# Patient Record
(demographics unavailable — no encounter records)

---

## 2025-01-07 NOTE — HISTORY OF PRESENT ILLNESS
[de-identified] : Cata Lagos is a 21 yo female who presents for evaluation of recurrent sinusitis. She notes 5-6 sinus infections in the past year, treated with multiple rounds of antibiotics. She notes recent headaches and lightheadedness. She notes significant forehead pressure and milder cheek pressure. She denies nasal congestion or rhinorrhea. She notes postnasal drainage. She denies recent fevers or chills. She denies vision changes or pain/restriction of extraocular movements. Her most recent sinus infection was 3 weeks ago, treated with antibiotics. She used flonase during the infection. She has not had allergy testing. [FreeTextEntry1] : 8/15/24 - Ms. Lagos presents for follow-up. She previously completed maximal medical therapy for sinusitis. She had improvement in symptoms but has now had increased nasal congestion and postnasal drainage. She denies fevers but notes chills. She denies vision changes, sinus pressure, or pain/restriction of extraocular movements.  1/7/25 - Cata Lagos presents for in office sinus procedure. She notes continued chronic sinusitis symptoms. She has had two sinus infections since last visit. No recent fevers.

## 2025-01-07 NOTE — PHYSICAL EXAM
[] : septum deviated to the right [Normal] : no abnormal secretions EKG/Imaging Studies/Labs/Medications

## 2025-01-07 NOTE — HISTORY OF PRESENT ILLNESS
[de-identified] : Cata Lagos is a 21 yo female who presents for evaluation of recurrent sinusitis. She notes 5-6 sinus infections in the past year, treated with multiple rounds of antibiotics. She notes recent headaches and lightheadedness. She notes significant forehead pressure and milder cheek pressure. She denies nasal congestion or rhinorrhea. She notes postnasal drainage. She denies recent fevers or chills. She denies vision changes or pain/restriction of extraocular movements. Her most recent sinus infection was 3 weeks ago, treated with antibiotics. She used flonase during the infection. She has not had allergy testing. [FreeTextEntry1] : 8/15/24 - Ms. Lagos presents for follow-up. She previously completed maximal medical therapy for sinusitis. She had improvement in symptoms but has now had increased nasal congestion and postnasal drainage. She denies fevers but notes chills. She denies vision changes, sinus pressure, or pain/restriction of extraocular movements.  1/7/25 - Cata Lagos presents for in office sinus procedure. She notes continued chronic sinusitis symptoms. She has had two sinus infections since last visit. No recent fevers.

## 2025-01-07 NOTE — ASSESSMENT
[FreeTextEntry1] : Cata Lagos presents for follow-up of chronic recurrent sinusitis and chronic rhinitis. She completed maximal medical therapy for sinusitis but had recurrence of symptoms. 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 CT sinus was obtained and reviewed showing mild septal deviation as well as right frontal, maxillary, and anterior ethmoid disease.  She underwent right frontal balloon sinuplasty, right maxillary balloon sinuplasty, right anterior ethmoidectomy in office today. She tolerated procedure well.   - sinus precautions for 2 weeks. - sinus rinses TID - start doxycycline x 7 days. Side effects of doxycycline use were discussed and include, but are not limited to diarrhea, rash, skin photosensitivity, skin hyperpigmentation, esophagitis, gastrointestinal issues, tooth discoloration, and hepatotoxicity. - tylenol prn for pain - f/u in 1 week.

## 2025-01-14 NOTE — DATA REVIEWED
[de-identified] : Pathology 1/7/25: Specimen(s) Submitted 1- Right sinus contents  Final Diagnosis  1.  Paranasal sinus, right sinus contents, endoscopic sinus surgery      - Chronic sinusitis with numerous eosinophils

## 2025-01-14 NOTE — HISTORY OF PRESENT ILLNESS
[de-identified] :  Cata Lagos is a 21 yo female who presents for evaluation of recurrent sinusitis. She notes 5-6 sinus infections in the past year, treated with multiple rounds of antibiotics. She notes recent headaches and lightheadedness. She notes significant forehead pressure and milder cheek pressure. She denies nasal congestion or rhinorrhea. She notes postnasal drainage. She denies recent fevers or chills. She denies vision changes or pain/restriction of extraocular movements. Her most recent sinus infection was 3 weeks ago, treated with antibiotics. She used flonase during the infection. She has not had allergy testing.    [FreeTextEntry1] :  8/15/24 - Ms. Lagos presents for follow-up. She previously completed maximal medical therapy for sinusitis. She had improvement in symptoms but has now had increased nasal congestion and postnasal drainage. She denies fevers but notes chills. She denies vision changes, sinus pressure, or pain/restriction of extraocular movements.  1/7/25 - Cata Lagos presents for in office sinus procedure. She notes continued chronic sinusitis symptoms. She has had two sinus infections since last visit. No recent fevers  1/14/25- Cata presents for follow up. She is s/p right frontal balloon sinuplasty, right maxillary balloon sinuplasty, right anterior ethmoidectomy in office 1/7/25. She is doing well. She denies fever or bleeding. She denies salty/metallic taste or constant clear rhinorrhea. She denies vision changes, painful extraocular movement.

## 2025-01-14 NOTE — ASSESSMENT
[FreeTextEntry1] : Cata Lagos presents for follow-up of chronic recurrent sinusitis and chronic rhinitis. She completed maximal medical therapy for sinusitis but had recurrence of symptoms. 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 CT sinus was obtained and reviewed showing mild septal deviation as well as right frontal, maxillary, and anterior ethmoid disease.  She underwent right frontal balloon sinuplasty, right maxillary balloon sinuplasty, right anterior ethmoidectomy in office on 1/7/25. She has done well since. Right sided sinus debridement was performed and she is healing nicely. Pathology revealed chronic sinusitis with numerous eosinophils. Will refer to allergy. She tolerated procedure well.   - sinus precautions for 1 more week. - sinus rinses TID - refer to allergy - f/u in 2 mo

## 2025-01-16 NOTE — SOCIAL HISTORY
[House] : [unfilled] lives in a house  [Central Forced Air] : heating provided by central forced air [Central] : air conditioning provided by central unit [Dry] : dry [Dust Mite Covers] : has dust mite covers [Bedroom] :  in bedroom [Basement] :  in basement  [Dog] : dog [Single] : single [FreeTextEntry1] : lives with mom, dad, 2 brothers Going for [employer] at [employer]  [Humidifier] : does not use a humidifier [Dehumidifier] : does not use a dehumidifier [Feather Pillows] : does not have feather pillows [Feather Comforter] : does not have a feather comforter [Living Area] : not in the living area [Smokers in Household] : there are no smokers in the home [de-identified] : DM covers at school [de-identified] : weight lifting

## 2025-01-16 NOTE — PHYSICAL EXAM
[Alert] : alert [Well Nourished] : well nourished [No Acute Distress] : no acute distress [Well Developed] : well developed [Posterior Pharyngeal Cobblestoning] : posterior pharyngeal cobblestoning [Normal Rate and Effort] : normal respiratory rhythm and effort [Normal Rate] : heart rate was normal  [Skin Intact] : skin intact  [Conjunctival Erythema] : no conjunctival erythema [Pale mucosa] : no pale mucosa [Boggy Nasal Turbinates] : no boggy and/or pale nasal turbinates [Pharyngeal erythema] : no pharyngeal erythema [Clear Rhinorrhea] : no clear rhinorrhea was seen [Wheezing] : no wheezing was heard

## 2025-01-16 NOTE — IMPRESSION
[_____] : grasses ([unfilled]) [Allergy Testing Dust Mite] : dust mites [Allergy Testing Mixed Feathers] : feathers [Allergy Testing Cockroach] : cockroach

## 2025-01-16 NOTE — REVIEW OF SYSTEMS
[Fatigue] : fatigue [Rhinorrhea] : rhinorrhea [Post Nasal Drip] : post nasal drip [Recurrent Sinus Infections] : recurrent sinus infections [Nl] : Integumentary [Fever] : no fever [Nosebleeds] : no epistaxis [Nasal Congestion] : no nasal congestion [Throat Itching] : no throat itching [Sneezing] : no sneezing [Recurrent Throat Infections] : no recurrence of throat infections [Recurrent Bronchitis] : no recurrent bronchitis [Recurrent Ear Infections] : no recurrence or ear infections [Recurrent Skin Infections] : no recurrent skin infections [Recurrent Pneumonia] : no ~T recurrent pneumonia

## 2025-01-16 NOTE — REASON FOR VISIT
[Evaluation/Consultation] : an evaluation/consultation of [Allergy Evaluation/ Skin Testing] : allergy evaluation and or skin testing [Congestion] : congestion [Runny Nose] : runny nose

## 2025-01-16 NOTE — HISTORY OF PRESENT ILLNESS
[Asthma] : asthma [Eczematous rashes] : eczematous rashes [Venom Reactions] : venom reactions [Food Allergies] : food allergies [Drug Allergies] : drug allergies [de-identified] : 21-year-old female referred by her ENT (Dr. Almendarez) here for allergy evaluation.  Pt has been complaining of chronic nasal congestion, runny nose, PND, fatigue, sinus pressure, HA for past 2-3 years with multiple sinus infections. In year 2024 pt had at least 7-8 sinus infections requiring antibiotics which was prescribed by her PCP. Last two infections did not respond to antibiotics. She has also tried Flonase PRN in past and Tylenol Cold and Sinus with minimal help. She also tried regular Tylenol which would help her HA but not her nasal symptoms.   CT scan of sinus done 08/2024 - showed mucosal thickening of paranasal sinuses with narrowing/obstruction of frontal recesses. Nasal septal deviation was also noted Pt underwent surgical intervention by ENT on 01/07/2024 - had right frontal and maxillary sinuplasty with right anterior ethmoidectomy.   Since the surgery pt is still c/o fatigue, runny nose and mild PND and would like to rule out allergies.   Pt also c/o pruritic rash whenever she goes on vacation and is exposed to sun. Currently denies any rashes  Denies any other symptoms or other infections elsewhere.

## 2025-01-16 NOTE — ASSESSMENT
[FreeTextEntry1] : 21 year old female with hx of chronic sinusitis s/p right frontal, right maxillary sinuplasty with right anterior ethmoidectomy on 01/7/2025 (Dr. Almendarez) here for allergy evaluation. Despite the surgery pt is still c/o runny nose and mild PND with fatigue - denies nasal congestion or sneezing Pt also c/o pruritic rash which occurs only when she on vacation and is exposed to sun - PMLE vs ? ACD to sunscreen.   ST today - significantly positive to tree pollen, grass pollen, moderately pos to Alternaria, mildly pos to weed pollen, dogs, cats and mouse.  Pt unable to tell if pt has increase symptoms in pollen season - pt advised to observe Pt has a dog a home but pt denies increase in symptoms around her dog  Suggest - Start using Zyrtec 10mg QD for 4 weeks and see how she does.  - Can also use Zyrtec 10mg PRN in spring pollen season - we can adjust meds based on pt's symptoms.  - If in future pt still get frequent sinus infections RTO and we can consider immune evaluation.   Rash  - Advised gradual sun exposure  - Advised to use hypoallergenic sunscreen SPF 30 or above - apply thickly and evenly.  - Pt to let us know if rash reoccurs.   Pt to call back in 2-3 weeks to let us know how she is doing  Pt will f/u in 4 months or PRN  Total time spent 45 minutes on the date of the encounter. This included time devoted to preparing to see the patient with review of previous medical record, obtaining medical history, performing physical exam, counseling and patient education with patient and family, ordering medications and lab studies, documentation in the medical record and coordination of care. The time spent is separate from time spent on separately billed procedures.